# Patient Record
Sex: FEMALE | Race: WHITE
[De-identification: names, ages, dates, MRNs, and addresses within clinical notes are randomized per-mention and may not be internally consistent; named-entity substitution may affect disease eponyms.]

---

## 2018-02-12 ENCOUNTER — HOSPITAL ENCOUNTER (OUTPATIENT)
Dept: HOSPITAL 25 - ED | Age: 58
Setting detail: OBSERVATION
Discharge: HOME | End: 2018-02-12
Attending: HOSPITALIST | Admitting: HOSPITALIST
Payer: COMMERCIAL

## 2018-02-12 VITALS — DIASTOLIC BLOOD PRESSURE: 53 MMHG | SYSTOLIC BLOOD PRESSURE: 119 MMHG

## 2018-02-12 DIAGNOSIS — R07.9: Primary | ICD-10-CM

## 2018-02-12 DIAGNOSIS — Z86.79: ICD-10-CM

## 2018-02-12 LAB
BASOPHILS # BLD AUTO: 0.1 10^3/UL (ref 0–0.2)
EOSINOPHIL # BLD AUTO: 0.2 10^3/UL (ref 0–0.6)
HCT VFR BLD AUTO: 41 % (ref 35–47)
HGB BLD-MCNC: 14 G/DL (ref 12–16)
INR PPP/BLD: 0.93 (ref 0.77–1.02)
LYMPHOCYTES # BLD AUTO: 2.2 10^3/UL (ref 1–4.8)
MCH RBC QN AUTO: 31 PG (ref 27–31)
MCHC RBC AUTO-ENTMCNC: 34 G/DL (ref 31–36)
MCV RBC AUTO: 91 FL (ref 80–97)
MONOCYTES # BLD AUTO: 0.7 10^3/UL (ref 0–0.8)
NEUTROPHILS # BLD AUTO: 3.5 10^3/UL (ref 1.5–7.7)
NRBC # BLD AUTO: 0 10^3/UL
NRBC BLD QL AUTO: 0.1
PLATELET # BLD AUTO: 210 10^3/UL (ref 150–450)
RBC # BLD AUTO: 4.49 10^6/UL (ref 4–5.4)
WBC # BLD AUTO: 6.7 10^3/UL (ref 3.5–10.8)

## 2018-02-12 PROCEDURE — 85610 PROTHROMBIN TIME: CPT

## 2018-02-12 PROCEDURE — 80053 COMPREHEN METABOLIC PANEL: CPT

## 2018-02-12 PROCEDURE — 84484 ASSAY OF TROPONIN QUANT: CPT

## 2018-02-12 PROCEDURE — 71045 X-RAY EXAM CHEST 1 VIEW: CPT

## 2018-02-12 PROCEDURE — 99283 EMERGENCY DEPT VISIT LOW MDM: CPT

## 2018-02-12 PROCEDURE — 36415 COLL VENOUS BLD VENIPUNCTURE: CPT

## 2018-02-12 PROCEDURE — 83880 ASSAY OF NATRIURETIC PEPTIDE: CPT

## 2018-02-12 PROCEDURE — 80061 LIPID PANEL: CPT

## 2018-02-12 PROCEDURE — 93005 ELECTROCARDIOGRAM TRACING: CPT

## 2018-02-12 PROCEDURE — G0378 HOSPITAL OBSERVATION PER HR: HCPCS

## 2018-02-12 PROCEDURE — 85025 COMPLETE CBC W/AUTO DIFF WBC: CPT

## 2018-02-12 PROCEDURE — 83036 HEMOGLOBIN GLYCOSYLATED A1C: CPT

## 2018-02-12 PROCEDURE — 85730 THROMBOPLASTIN TIME PARTIAL: CPT

## 2018-02-12 PROCEDURE — 96374 THER/PROPH/DIAG INJ IV PUSH: CPT

## 2018-02-12 RX ADMIN — SUCRALFATE SCH GM: 1 TABLET ORAL at 08:56

## 2018-02-12 RX ADMIN — SUCRALFATE SCH GM: 1 TABLET ORAL at 11:28

## 2018-02-12 NOTE — HP
CC:  Vladimir Hannah MD

 

HISTORY AND PHYSICAL:

 

DATE OF ADMISSION:  18

 

TIME OF EVALUATION:  0600.

 

PRIMARY CARE PHYSICIAN:  Vladimir Hannah MD.

 

CHIEF COMPLAINT:  Chest pain.

 

HISTORY OF PRESENT ILLNESS:  This is a 57-year-old female with past medical 
history of asthma and GERD, who presents to the emergency room with increase in 
frequency of chest pain.  The patient states she has had these symptoms off and 
on since December; however, over the past week, it has been more frequent and 
every night. She states that she falls asleep, within an hour she wakes up with 
chest pain that radiates across her chest that comes up from her epigastric 
region, radiates over to her left shoulder and down her left arm.  She states 
this wakes her up several times during the night and can last anywhere from 10 
minutes to an hour.  She thought it was related to her GERD, although her 
symptoms are different than her usual GERD symptoms.  She increased her AcipHex 
dose.  She started taking her Carafate and Tums, but there was no improvement 
in her symptoms and they have gotten worse in frequency and duration.  She 
denies any associated shortness of breath.  No nausea or diaphoresis.  She had 
a stress test many years ago that was unremarkable.  She denies any changes in 
her diet.  No weight changes.  No recent URI.  Her asthma has been well 
controlled.  She has not needed her inhaler.  She states she walks about a mile 
and half every day and she denies any exertional or heavy lifting activity.  In 
the emergency room, the patient had labs and imaging. She was given 324 mg of 
aspirin and pantoprazole IV 40 mg and was referred to the hospitalist service 
for further evaluation.

 

PAST MEDICAL HISTORY:

1.  History of GERD.

2.  Asthma.

3.  History of skipped beats.

4.  Hypertension.

 

MEDICATIONS:

1.  AcipHex p.o. b.i.d. as needed.

2.  Carafate t.i.d. as needed.

3.  Tums as needed.

4.  Benicar daily.

5.  Advair 500/50 one puff inhaled b.i.d.

6.  Zyrtec daily.

7.  Albuterol as needed.

8.  Ibuprofen as needed.

 

ALLERGIES:  CEFUROXIME, CLARITHROMYCIN, PENICILLIN, and SULFA.

 

FAMILY HISTORY:  Mother  from emphysema.  Father  at age 84 
from old age.

 

SOCIAL HISTORY:  The patient lives at home with her  who is her 
healthcare proxy.  She is retired from the school system.  No history of tobacco
, alcohol, or illicit drug use.  She walks about a mile and half a day.

 

REVIEW OF SYSTEMS:  A 14-point review of systems as mentioned in the HPI, 
otherwise negative.

 

                               PHYSICAL EXAMINATION

 

GENERAL:  In no acute distress, resting comfortably with her  at the 
bedside.

 

VITAL SIGNS:  Temp 96.6, pulse rate 65, respiratory rate 12, oxygen saturation 
98% on room air, and blood pressure 153/65.

 

HEENT:  Head:  Normocephalic.  Pupils equal and reactive.  Anicteric.  
Oropharynx: Mucous membranes moist.

 

NECK:  Supple.  No lymphadenopathy.  No nuchal rigidity.

 

RESPIRATORY:  Clear to auscultation.  No wheezes, rhonchi, or rales.

 

CARDIAC:  Regular rate and rhythm.  Ectopic beats noted.  No murmurs, rubs, or 
gallops.

 

ABDOMEN:  Soft, nontender, and nondistended.

 

EXTREMITIES:  No clubbing, cyanosis, or edema.  +2 DPs.

 

NEUROLOGIC:  Alert and oriented x3.  No focal neurologic deficits.

 

 LABORATORY DATA:  White count 6.7, hemoglobin 14, hematocrit 41, and platelets 
210. INR is 0.93.  Sodium 135, potassium 3.9, chloride 103, bicarb 23, BUN 16, 
creatinine 0.85, glucose 141.  Troponin is 0.  BNP is 21.

 

RADIOGRAPHIC DATA:  Shows normal sinus rhythm with PACs.  Chest x-ray wet read, 
no acute findings.

 

ASSESSMENT AND PLAN:  This is a 57-year-old female with a past medical history 
of gastroesophageal reflux disease and asthma, who presents to the emergency 
room with increase in her frequency and duration of chest pain that wakes her 
up at night.

 

1.  Chest pain.  

Assessment.  The patient's findings are atypical; however, concerning that they 
have not improved with reflux medications.  Her symptoms are not reproducible 
when she is active walking.  

Plan:  Admit her for stress test to rule out acute coronary syndrome.  We will 
repeat her troponin.  Check a lipid panel and add on hemoglobin A1c in the 
setting of elevated glucose.  Continue her on a baby aspirin and order a 
treadmill stress test.  We will keep her n.p.o. in case that needs to be 
converted to a nuclear stress test.

2.  Chronic medical problems.  Gastroesophageal reflux disease.  We will place 
her on omeprazole in place of her AcipHex.  Continue the Carafate.  We will 
also add Tums as needed.

3.  Hypertension.  We do not have Benicar in formulary; place her on valsartan.

4.  Asthma.  Resume her home inhaler regimen.

5.  DVT prophylaxis.  The patient scores moderate risk, but she is going to CDU 
unit.  We will encourage ambulation.  If she has prolonged hospitalization, we 
will change this to chemical anticoagulation.

6.  Code status is full code.

 

PATIENT TIME:  Greater than 45 minutes was spent doing the history and physical
, more than half the time spent in direct patient contact.

 

 

 

[id number]/CPS #: [id number]

BRENDA

## 2018-02-12 NOTE — RAD
HISTORY: Chest pain



COMPARISONS: None



VIEWS: 1: frontal portable view of the chest at 5:18 AM



FINDINGS:

LINES AND TUBES: None.

CARDIOMEDIASTINAL SILHOUETTE: The cardiomediastinal silhouette is normal for portable

technique.

PLEURA: The costophrenic angles are sharp. No pleural abnormalities are noted.

LUNG PARENCHYMA: The lungs are clear.

ABDOMEN: The upper abdomen is clear. There is no subphrenic gas.

BONES AND SOFT TISSUES: No bone or soft tissue abnormalities are noted.



IMPRESSION: NO ACTIVE CARDIOPULMONARY DISEASE.

## 2018-02-12 NOTE — ED
Heydi LEONARD Thomas, scribed for Char Bernal MD on 02/12/18 at 0451 .





HPI Chest Pain





- HPI Summary


HPI Summary: 





The patient is a 57 year old female complaining of chest pain that have been 

present for the last few hours. The pain radiates to her back and to her 

shoulders. The pain is rated 3/10. Past medical history includes GERD. She 

describes a sensation of tremors. She denies shortness of breath, diaphoresis, 

and dizziness. She is a non-smoker. She denies a family history of CAD. She 

declines pain medication. 





- History of Current Complaint


Chief Complaint: EDChestPainROMI


Time Seen by Provider: 02/12/18 04:40


Hx Obtained From: Patient


Onset/Duration: Started Hours Ago, Still Present


Timing: Constant


Current Severity: Mild


Pain Intensity: 3


Pain Scale Used: 0-10 Numeric


Chest Pain Radiates: Yes


Chest Pain Radiates To:: Back, Other - shoulders


Alleviating Factor(s): Nothing


Associated Signs and Symptoms: Negative: Dizziness, Shortness of Breath, 

Diaphoresis


Related History: Obesity





- Allergy/Home Medications


Allergies/Adverse Reactions: 


 Allergies











Allergy/AdvReac Type Severity Reaction Status Date / Time


 


cefuroxime [From Ceftin] Allergy  Hives Verified 02/12/18 04:41


 


clarithromycin [From Biaxin] Allergy  Hives Verified 02/12/18 04:41


 


Penicillins Allergy  Hives Verified 02/12/18 04:41


 


Sulfa (Sulfonamide Allergy  Hives Verified 02/12/18 04:41





Antibiotics)     














PMH/Surg Hx/FS Hx/Imm Hx


Endocrine/Hematology History: Reports: Hx Diabetes


Cardiovascular History: Reports: Hx Hypertension


   Denies: Hx Coronary Artery Disease, Hx Pacemaker/ICD


Respiratory History: Reports: Hx Asthma


Sensory History: 


   Denies: Hx Hearing Aid


Psychiatric History: 


   Denies: Hx Panic Disorder





- Cancer History


Hx Chemotherapy: No


Hx Radiation Therapy: No





- Surgical History


Surgery Procedure, Year, and Place: ALYSSA ARTHROSCOPIC KNEES.  2 C SECTIONS.  

UMBILICUS HERNIA.  Lt ANKLE.  BUNIONECTOMY -Rt


Infectious Disease History: No


Infectious Disease History: 


   Denies: Traveled Outside the [place] in Last 30 Days





- Family History


Known Family History: 


   Negative: Cardiac Disease





- Social History


Alcohol Use: None


Hx Substance Use: No


Substance Use Type: Reports: None


Hx Tobacco Use: No


Smoking Status (MU): Never Smoked Tobacco





Review of Systems


Negative: Fever, Skin Diaphoresis


Positive: Chest Pain


Negative: Shortness Of Breath


Neurological: Negative - dizziness, Other - Tremors sensation


All Other Systems Reviewed And Are Negative: Yes





Physical Exam





- Summary


Physical Exam Summary: 





VITAL SIGNS: Reviewed.


GENERAL: Patient is a well-developed and nourished female who is lying 

comfortable in the stretcher. Patient is not in any acute respiratory distress.


HEAD AND FACE: No signs of trauma. No ecchymosis, hematomas or skull 

depressions. No sinus tenderness.


EYES: PERRLA, EOMI x 2, No injected conjunctiva, no nystagmus.


EARS: Hearing grossly intact. Ear canals and tympanic membranes are within 

normal limits.


MOUTH: Oropharynx within normal limits.


NECK: Supple, trachea is midline, no adenopathy, no JVD, no carotid bruit, no c-

spine tenderness, neck with full ROM.


CHEST: Symmetric, no tenderness at palpation


LUNGS: Clear to auscultation bilaterally. No wheezing or crackles.


CVS: Regular rate and rhythm, S1 and S2 present, no murmurs or gallops 

appreciated.


ABDOMEN: Soft, non-tender. No signs of distention. No rebound no guarding, and 

no masses palpated. Bowel sounds are normal.


EXTREMITIES: FROM in all major joints, no edema, no cyanosis or clubbing.


NEURO: Alert and oriented x 3. No acute neurological deficits. Speech is normal 

and follows commands.


SKIN: Dry and warm


Triage Information Reviewed: Yes


Vital Signs On Initial Exam: 


 Initial Vitals











Temp Pulse Resp BP Pulse Ox


 


 96.6 F   74   18   182/88   100 


 


 02/12/18 04:38  02/12/18 04:38  02/12/18 04:38  02/12/18 04:38  02/12/18 04:38











Vital Signs Reviewed: Yes





Diagnostics





- Vital Signs


 Vital Signs











  Temp Pulse Resp BP Pulse Ox


 


 02/12/18 04:38  96.6 F  74  18  182/88  100














- Laboratory


Result Diagrams: 


 02/12/18 04:46





 02/12/18 04:46


Lab Statement: Any lab studies that have been ordered have been reviewed, and 

results considered in the medical decision making process.





- Radiology


  ** CXR


Xray Interpretation: No Acute Changes - No acute process


Radiology Interpretation Completed By: ED Physician





- EKG


  ** 04:29


Cardiac Rate: NL


EKG Rhythm: Sinus Rhythm - at 77 BPM


EKG Interpretation: Nonspecific T-waves changes in inferior leads. 





Chest Pain Course/Dx





- Course


Assessment/Plan: The patient is a 57 year old female complaining of chest pain 

that have been present for the last few hours. Bloodwork was obtained. CXR 

shows no acute process. EKG shows nonspecific T-waves changes in the inferior 

leads. The patient was given  and Protonix in the ED course. Dr. Mei, 

hospitalist, will admit the patient.





- Diagnoses


Provider Diagnoses: 


 Chest pain








- Provider Notifications


Discussed Care Of Patient With: Magy Mei


Time Discussed With Above Provider: 05:53


Instructed by Provider To: Admit As Inpatient





Discharge





- Discharge Plan


Condition: Stable


Disposition: ADMITTED TO [facility]


Referrals: 


Vladimir Hannah MD [Primary Care Provider] - 





The documentation as recorded by the Heydi velasco Thomas accurately reflects 

the service I personally performed and the decisions made by me, Char Bernal MD.

## 2018-02-13 NOTE — DS
CC:  Dr. Hannah; Dr. Abril Nunez

 

DISCHARGE SUMMARY:

 

DATE OF ADMISSION:  02/12/18

 

DATE OF DISCHARGE:  02/12/18

 

PRIMARY CARE PHYSICIAN:  Vladimir Hannah MD

 

ATTENDING PHYSICIAN:  Dr. Magy Mei.

 

HOSPITAL COURSE:  This is a pleasant 57-year-old female patient, who presented to the emergency [facility] with a complaint of diffuse midsternal and epigastric chest pain, radiating to the left armpit 
and shoulder.  The patient states that this primarily happens at night when she is lying down flat.  
There were no further precipitating factors.  The patient did state she does have a long standing his
tory of GERD, had an endoscopy approximately 20 years ago and she did have some esophagitis at that t
gus.  However, it was felt that based on her presentation of symptoms, she stayed in observation and 
has cardiac workup and stress test performed.  Troponins were drawn, they were negative x2.  She unde
rwent exercise stress test today on 02/12/18.  The results of which show a low risk for cardiac event
s.  The patient did receive several GI medications while she was inpatient. She had Carafate, Tums, o
meprazole, and Zofran.  The patient states that she is pain free right now, also had IV Protonix and 
she is feeling better.  I had a long discussion with the patient regarding her persistent GERD and th
at she should followup with Dr. Hannah, her primary care provider and also Dr. Cochran who actually 
did defer endoscopy 20 years ago.  Then, I would recommend that she has a followup endoscopy as soon 
as she is able to get that scheduled.  I placed the patient on Dexilant 60 mg daily as a trial for 30
 days to see if this helps alleviate her symptoms.  The patient states she will follow up with Dr. Concha spears and discuss with him if this is working for her.

 

DISCHARGE MEDICATIONS:  Include:

 

1.  Advair Diskus 500/50 one puff 1 time daily.

2.  Zyrtec 1 tablet daily.

3.  Benicar 1 tablet daily.

4.  Dexilant 60 mg daily. DISCHARGE DIAGNOSES:

1.  Atypical chest pain, likely related to gastroesophageal reflux disease.

2.  History of hypertension.

3.  History of reactive airway disease.

 

Vital signs; blood pressure 119/53, heart rate 80, respiratory rate 18, room air saturation 97%, temp
erature 98.3.

 

PHYSICAL EXAMINATION:  The patient is in general, awake, alert, in no acute distress.  Vital signs as
 above.  HEENT:  The patient is atraumatic, normocephalic. PERRLA with nonicteric sclerae.  Neck is s
upple and nontender.  No JVD noted.  No thyromegaly appreciated and no carotid bruit auscultated.  Ca
rdiovascular:  S1, S2 are present.  Rate and rhythm are regular.  No murmurs, gallops, or rubs noted.
 Lungs are clear bilaterally to auscultation with no wheezing, rhonchi, or rales. Abdomen is soft, no
ntender, moderately obese, positive bowel sounds in all 4 quadrants.   was deferred.  Musculoskelet
al:  There is no clubbing, no cyanosis, and no edema.  She has +2 distal pulses palpable.  She has gr
oss motor and sensation intact.  She has steady gait.  Neurologic:  Grossly intact with no focal defi
cits.  Psychiatric:  She is cooperative and appropriate.

 

LABORATORY DATA:  WBC 6.7, RBC is 4.49, hemoglobin 14, hematocrit 41, platelets 210.  Sodium 135, pot
assium 3.9, chloride 103, CO2 23, BUN 16, creatinine 0.85. GFR 68.5.  Glucose 141, calcium 6.7, bilir
ubin 10.1, AST 33, ALT 35, alk phos 75. Troponin 0.00 and 0.00.  BNP 21.  Total protein 7.5, albumin 
4.4, globulin 3.1. Triglycerides 251, cholesterol 208, , HDL is 47.

 

DISCHARGE PLAN:  The patient is to follow up with Dr. Hannah, her primary care physician in the next
 1 to 2 weeks and assess her response to Dexilant and also follow up with Dr. Cochran in GI and magda vitale pursue having an outpatient endoscopy.  The patient verbalized understanding of her discharge 
instructions, her medications at the time of discharge and her followups.

 

____________________________________ NIK LOPEZ, KENNETH

 

[id number]/CPS #: [id number]

## 2018-04-26 ENCOUNTER — HOSPITAL ENCOUNTER (EMERGENCY)
Dept: HOSPITAL 25 - ED | Age: 58
Discharge: HOME | End: 2018-04-26
Payer: COMMERCIAL

## 2018-04-26 DIAGNOSIS — N39.0: Primary | ICD-10-CM

## 2018-04-26 DIAGNOSIS — Z86.79: ICD-10-CM

## 2018-04-26 PROCEDURE — 74018 RADEX ABDOMEN 1 VIEW: CPT

## 2018-04-26 PROCEDURE — 87186 SC STD MICRODIL/AGAR DIL: CPT

## 2018-04-26 PROCEDURE — 87077 CULTURE AEROBIC IDENTIFY: CPT

## 2018-04-26 PROCEDURE — 81015 MICROSCOPIC EXAM OF URINE: CPT

## 2018-04-26 PROCEDURE — 99282 EMERGENCY DEPT VISIT SF MDM: CPT

## 2018-04-26 PROCEDURE — 87086 URINE CULTURE/COLONY COUNT: CPT

## 2018-04-26 PROCEDURE — 81003 URINALYSIS AUTO W/O SCOPE: CPT

## 2018-04-26 NOTE — ED
GI/ HPI





- HPI Summary


HPI Summary: 


58-year-old female presents with abrupt onset of urinary urgency with end 

urination pressure and hematuria.  Symptoms started between 2 and 3 this 

morning while she was up reading a book.  She admits she had some left flank 

discomfort a few days preceding this however does not have any pain here now 

nor any pain in her abdomen. Denies fevers, chills, nausea, vomiting, diarrhea.

  Only other concerning medical issues are her GERD which she is treating 

through GI specialist.  Denies vaginal irritation or discharge.  Last menstrual 

cycle was in her early 50s. Has been drinking fluids all morning to help flush 

bladder. Reports she has 2 c coffee in AM and lots of water throughout the day. 

No h/o UTI/stone.








- History of Current Complaint


Chief Complaint: EDUrogenitalProblems


Time Seen by Provider: 04/26/18 06:22


Stated Complaint: UROGENITAL PROBLEMS


Hx Obtained From: Patient, Family/Caretaker - 


Pain Intensity: 4





- Allergy/Home Medications


Allergies/Adverse Reactions: 


 Allergies











Allergy/AdvReac Type Severity Reaction Status Date / Time


 


cefuroxime [From Ceftin] Allergy  Hives Verified 04/26/18 06:06


 


clarithromycin [From Biaxin] Allergy  Hives Verified 04/26/18 06:06


 


Penicillins Allergy  Hives Verified 04/26/18 06:06


 


Sulfa (Sulfonamide Allergy  Hives Verified 04/26/18 06:06





Antibiotics)     











Home Medications: 


 Home Medications





Cetirizine* [ZyrTEC 10 MG TAB*] 10 mg PO DAILY PRN 04/26/18 [History Confirmed 

04/26/18]


Olmesartan (NF) [Benicar (NF)] 20 mg PO DAILY 04/26/18 [History Confirmed 04/26/ 18]


Rabeprazole (NF) [Aciphex (NF)] 20 mg PO BID 04/26/18 [History Confirmed 04/26/ 18]











PMH/Surg Hx/FS Hx/Imm Hx


Previously Healthy: Yes


Endocrine/Hematology History: Reports: Hx Diabetes - controlled w/ diet


   Denies: Hx Anticoagulant Therapy, Hx Blood Disorders


Cardiovascular History: Reports: Hx Hypertension


   Denies: Hx Coronary Artery Disease, Hx Pacemaker/ICD


Respiratory History: Reports: Hx Asthma - bronchial, allergenic asthma


GI History: Reports: Hx Gastroesophageal Reflux Disease - poorly controlled - 

on carafate, PPI - working w/ GI


Musculoskeletal History: Reports: Hx Gout - possibly in Lt wrist?


Sensory History: Reports: Hx Contacts or Glasses


   Denies: Hx Hearing Aid


Opthamlomology History: Reports: Hx Contacts or Glasses


Psychiatric History: 


   Denies: Hx Panic Disorder





- Cancer History


Hx Chemotherapy: No


Hx Radiation Therapy: No





- Surgical History


Surgery Procedure, Year, and Place: ALYSSA ARTHROSCOPIC KNEES.  2 C SECTIONS.  

UMBILICUS HERNIA.  Lt ANKLE.  BUNIONECTOMY -Rt


Infectious Disease History: No


Infectious Disease History: 


   Denies: Traveled Outside the [place] in Last 30 Days





- Family History


Known Family History: 


   Negative: Cardiac Disease





- Social History


Occupation: Employed Full-time


Lives: With Family


Alcohol Use: Rare


Hx Substance Use: No


Substance Use Type: Reports: None


Hx Tobacco Use: No


Smoking Status (MU): Never Smoked Tobacco





Review of Systems


Constitutional: Negative


Negative: Chest Pain


Negative: Shortness Of Breath


Gastrointestinal: Negative


Positive: see HPI


Musculoskeletal: Negative


Skin: Negative


Neurological: Negative


Psychological: Normal


All Other Systems Reviewed And Are Negative: Yes





Physical Exam


Triage Information Reviewed: Yes


Vital Signs On Initial Exam: 


 Initial Vitals











Temp Pulse Resp BP Pulse Ox


 


 97.0 F   100   18   165/81   99 


 


 04/26/18 06:03  04/26/18 06:03  04/26/18 06:03  04/26/18 06:03  04/26/18 06:03











Vital Signs Reviewed: Yes


Appearance: Positive: Well-Appearing, No Pain Distress, Obese


Skin: Positive: Warm, Skin Color Reflects Adequate Perfusion, Dry


Head/Face: Positive: Normal Head/Face Inspection


Eyes: Positive: Normal, EOMI, Conjunctiva Clear - anicteric sclera


ENT: Positive: Normal ENT inspection, Pharynx normal - mucosa moist


Neck: Positive: Supple


Respiratory/Lung Sounds: Positive: Clear to Auscultation, Breath Sounds Present


Cardiovascular: Positive: Normal, RRR, Pulses are Symmetrical in both Upper and 

Lower Extremities


Abdomen Description: Positive: Nontender, No Organomegaly, Soft.  Negative: CVA 

Tenderness (R), CVA Tenderness (L), Distended, Guarding


Bowel Sounds: Positive: Present


Musculoskeletal: Positive: Normal, Strength/ROM Intact


Neurological: Positive: Normal, Sensory/Motor Intact, Alert, Oriented to Person 

Place, Time, CN Intact II-III


Psychiatric: Positive: Normal





Diagnostics





- Vital Signs


 Vital Signs











  Temp Pulse Resp BP Pulse Ox


 


 04/26/18 06:03  97.0 F  100  18  165/81  99














- Laboratory


Lab Results: 


 Lab Results











  04/26/18 Range/Units





  06:09 


 


Urine Color  Yellow  


 


Urine Appearance  Cloudy  


 


Urine pH  6.0  (5-9)  


 


Ur Specific Gravity  1.004 L  (1.010-1.030)  


 


Urine Protein  2+(100 mg/dl) A  (Negative)  


 


Urine Ketones  Negative  (Negative)  


 


Urine Blood  3+ A  (Negative)  


 


Urine Nitrate  Negative  (Negative)  


 


Urine Bilirubin  Negative  (Negative)  


 


Urine Urobilinogen  Negative  (Negative)  


 


Ur Leukocyte Esterase  3+ A  (Negative)  


 


Urine WBC (Auto)  3+(>20/hpf) A  (Absent)  


 


Urine RBC (Auto)  3+(>10/hpf) A  (Absent)  


 


Ur Squamous Epith Cells  Present A  (Absent)  


 


Urine Bacteria  1+ A  (Absent)  


 


Urine Glucose  Negative  (Negative)  











Lab Statement: Any lab studies that have been ordered have been reviewed, and 

results considered in the medical decision making process.





GIGU Course/Dx





- Course


Course Of Treatment: Based on labs and clinical presentation w/ lack of acute 

findings on images concerning for stone, she most likely has UTI. Dr. Koenig 

also saw pt and completed care re: dx, med, f/u.





- Diagnoses


Provider Diagnoses: 


 UTI (urinary tract infection)








Discharge





- Sign-Out/Discharge


Documenting (check all that apply): Discharge/Admit/Transfer





- Discharge Plan


Condition: Good


Disposition: HOME


Prescriptions: 


Docusate Sodium [Colace] 100 mg PO BID #30 capsule


Levofloxacin TAB* [Levaquin TAB*] 500 mg PO DAILY #6 tab


Phenazopyridine 200 mg (NF) [Pyridium 200 MG tab *] 200 mg PO TID #9 tab


Patient Education Materials:  Urinary Tract Infection in Women (ED)


Referrals: 


Vladimir Hannah MD [Primary Care Provider] - 


Additional Instructions: 


Drink plenty of fluids, cranberry may help.





Return with fever, vomiting, back pain, worse or other concerns.





Call your doctor today to follow up.





- Billing Disposition and Condition


Condition: GOOD


Disposition: HOME

## 2018-04-26 NOTE — ED
Progress





- Progress Note


Progress Note: 





I supervised the PA. I performed a hx and physical on this patient.





Hx: Burning at end urination. Frequency. No fever, vomiting. Occassional R 

sided back pain.


PE: AAO, no distress. No bladder pain or CVA tenderness.


Plan: Oral abx here. Tx for constipation (opiate related). Levaquin/colace/

pyridium.





Dx: Acute cystitis.





Discharge





- Sign-Out/Discharge


Documenting (check all that apply): Discharge/Admit/Transfer





- Discharge Plan


Condition: Good


Disposition: HOME


Prescriptions: 


Docusate Sodium [Colace] 100 mg PO BID #30 capsule


Levofloxacin TAB* [Levaquin TAB*] 500 mg PO DAILY #6 tab


Phenazopyridine 200 mg (NF) [Pyridium 200 MG tab *] 200 mg PO TID #9 tab


Patient Education Materials:  Urinary Tract Infection in Women (ED)


Referrals: 


Vladimir Hannah MD [Primary Care Provider] - 


Additional Instructions: 


Drink plenty of fluids, cranberry may help.





Return with fever, vomiting, back pain, worse or other concerns.





Call your doctor today to follow up.





- Billing Disposition and Condition


Condition: GOOD


Disposition: HOME

## 2018-04-26 NOTE — XMS REPORT
Amira Red

 Created on:2018



Patient:Amira Red

Sex:Female

:1960

External Reference #:[id number]





Demographics







 Address  [address] [place] [address]

 

 Home Phone  [phone]

 

 Email Address  [email or web address]

 

 Preferred Language  English

 

 Marital Status  Not  Or 

 

 Bahai Affiliation  Unknown

 

 Race  White

 

 Ethnic Group  Not  Or 









Author







 Organization  [employer]

 

 Address  [address] [address]

 

 Phone  [phone]









Support







 Name  Relationship  Address  Phone

 

 Feliberto Red  Unavailable  [address] [facility] [address]  [phone]



     [place] [address]  









Care Team Providers







 Name  Role  Phone

 

 Vladimir Hannah MD  Primary Care Physician  Unavailable









Payers







 Type  Date  Identification Numbers  Payment Provider  Subscriber

 

 Commercial  Effective:  Policy Number:  [employer]  Feliberto Najera



   2018  [id number]    









 Group Number: [id number]  [address]

 

 PayID: [id number]  [place] [address]









 Workers Compensation  Onset: 2014  Policy Number: [id number]  [employer]  Amira Red









 Group Number: [id number]  [address]

 

 Group Name: [id number]  [place] [address]









 PayID: [email or web address]







Problems







 Date  Description  Provider  Status

 

 Onset: 2015  Plantar fascial fibromatosis  Naldo Amaro M.D.  Active







Family History







 Date  Family Member(s)  Problem(s)  Comments

 

   General  Diabetes  







Social History







 Type  Date  Description  Comments

 

 Lives With      

 

 ETOH Use    Occasionally consumes alcohol  

 

 Smoking    Patient has never smoked  

 

 Exercise Type/Frequency    Exercises regularly  







Allergies, Adverse Reactions, Alerts







 Date  Description  Reaction  Status  Severity  Comments

 

 2013  Sulfa Antibiotics    active    

 

 2013  Penicillin    active    

 

 2013  Biaxin    active    

 

 2013  Ceftin    active    







Medications







 Medication  Date  Status  Form  Strength  Qnty  SIG  Indications  Ordering



                 Provider

 

 Ibuprofen    Active  Tablets  600mg  60tabs  1 by    Louie



   014          mouth    Young,



             three    M.D.



             times a    



             day as    



             needed    

 

 Aciphex    Active            Unknown



   000              

 

 Zyrtec Allergy    Active            Unknown



   000              

 

 Symbicort    Active            Unknown



   000              

 

 Dulera    Active  Aerosol  200-5mcg/A    1 unit    Unknown



   000      ct    puff    



             twice a    



             day    

 

 Micardis    Active  Tablets  40mg        Unknown



   000              

 

 Oxycodone-Aceta    Active  Tablets  5-325mg    Take One    Unknown



 minophen  000          Tablet By    



             Mouth    



             Every 6    



             Hours as    



             Needed    



             For Pain    



             Maximum    



             Teri    

 

 Prednisone    Active  Tablets  20mg        Brandee Bernal MD

 

 Pantoprazole    Active  Tablets  40mg    Take 1    Unknown



 Sodium  000    DR      Tablet By    



             Mouth    



             Every Day    

 

 Sucralfate    Active  Tablets  1gm        Brandee Cochran MD







Vital Signs







 Date  Vital  Result  Comment

 

 2018  Height  61 inches  5'1"









 Weight  192.00 lb  

 

 BP Systolic  123 mmHg  

 

 BP Diastolic  79 mmHg  

 

 Respiratory Rate  15 /min  

 

 Pain Level  2  

 

 BMI (Body Mass Index)  36.3 kg/m2  









 2015  Height  61 inches  5'1"









 Weight  195.00 lb  

 

 BMI (Body Mass Index)  36.8 kg/m2  









 10/23/2015  Height  61 inches  5'1"









 Weight  195.00 lb  

 

 Pain Level  7  

 

 BMI (Body Mass Index)  36.8 kg/m2  









 2015  Height  61 inches  5'1"









 Weight  195.00 lb  

 

 Pain Level  5  

 

 BMI (Body Mass Index)  36.8 kg/m2  









 10/30/2014  Height  61 inches  5'1"









 Weight  195.00 lb  

 

 Pain Level  5  

 

 BMI (Body Mass Index)  36.8 kg/m2  









 2014  Height  61 inches  5'1"









 Weight  195.00 lb  

 

 Heart Rate  81 /min  

 

 BP Systolic  153 mmHg  

 

 BP Diastolic  90 mmHg  

 

 Pain Level  4  

 

 BMI (Body Mass Index)  36.8 kg/m2  









 2013  Height  61 inches  5'1"









 Weight  180.00 lb  

 

 Heart Rate  74 /min  

 

 BP Systolic  130 mmHg  

 

 BP Diastolic  81 mmHg  

 

 BMI (Body Mass Index)  34.0 kg/m2  







Results







 Description

 

 No Information







Procedures







 Date  CPT Code  Description  Status

 

 2018  01666  Treadmill Interp/Report Only  Completed

 

 2018  93822  Stress Test Supervsn W/Out I/R  Completed

 

 10/23/2017  49729  Destruction Of Benign Lesions Any Method 1-14 lesions  
Completed

 

 2014  38711  Rad Shoulder Comp, Min. 2 Views  Completed

 

 2013  40412  Xray Knee 3 Views  Completed







Encounters







 Type  Date  Location  Provider  CPT E/M  Dx

 

 Office Visit  2018  [facility],  [place]  [address]  
R07.89



   1:51p  [facility]  KENNETH Canales    









 K21.9

 

 J45.20

 

 I10









 Office Visit  10/23/2017  9:30a  [facility]  Ricardo Gomez MD  [address]  
L21.8









 L28.1

 

 L53.8

 

 Z78.9

 

 L29.8









 Office Visit  2015  3:10p  [facility]  Naldo Amaro  
[address]  M72.2



     ER[facility] LINDER    

 

 Office Visit  10/23/2015  3:00p  [facility]  Naldo Amaro  
25224  M72.2



     ERIC LINDER    

 

 Office Visit  2015  3:30p  [facility]  Naldo Amaro  
04098  [phone]



     ERIC LINDER    

 

 Office Visit  10/30/2014  2:45p  [facility]  Louie Perez M.D.  
90992  840.8



     C.MVANESSA      

 

 Office Visit  2013  9:45a  [facility]  [place]  
[address]67  [phone]



     ERIC LINDER    

 

 Office Visit  2013  9:00a  [facility]  [place]  
11027  [phone]



     ERIC LINDER    







Plan of Care

Future Appointment(s):2018  8:30 am - Naldo Barajas MD at [facility]

## 2018-04-26 NOTE — RAD
HISTORY:  Hematuria, left flank discomfort



COMPARISONS: None



VIEWS: Frontal views of the abdomen.



FINDINGS: 

BOWEL: There is a nonobstructive bowel gas pattern. There is a large amount of stool

within the colon.

CALCULI: There are no abnormal calculi, though evaluation is limited by overlying bowel..

BONES AND SOFT TISSUES: Mild degenerative changes are noted.

OTHER FINDINGS: The lung bases are clear. There is no subphrenic gas.



IMPRESSION: 

NO APPRECIABLE NEPHROLITHIASIS.

## 2018-04-29 NOTE — PN
Progress Note





- Progress Note


Date of Service: 04/26/18


Note: 


Pt. seen in the ER 4/26/18 for hematuria. She was dx with a UTI and placed on 

levaquin. Urine culture today is growing 25-50K e. coli susceptible to 

Levaquin.  Will continue current treatment.